# Patient Record
Sex: FEMALE | Race: WHITE
[De-identification: names, ages, dates, MRNs, and addresses within clinical notes are randomized per-mention and may not be internally consistent; named-entity substitution may affect disease eponyms.]

---

## 2021-10-08 ENCOUNTER — HOSPITAL ENCOUNTER (EMERGENCY)
Dept: HOSPITAL 83 - ED | Age: 51
Discharge: HOME | End: 2021-10-08
Payer: COMMERCIAL

## 2021-10-08 VITALS — HEIGHT: 63.98 IN | BODY MASS INDEX: 31.58 KG/M2 | WEIGHT: 185 LBS

## 2021-10-08 DIAGNOSIS — L98.9: Primary | ICD-10-CM

## 2021-10-08 DIAGNOSIS — L03.221: ICD-10-CM

## 2021-10-25 ENCOUNTER — HOSPITAL ENCOUNTER (EMERGENCY)
Age: 51
Discharge: HOME | End: 2021-10-25
Payer: COMMERCIAL

## 2021-10-25 VITALS
HEART RATE: 86 BPM | OXYGEN SATURATION: 99 % | HEIGHT: 64 IN | RESPIRATION RATE: 16 BRPM | BODY MASS INDEX: 33.8 KG/M2 | DIASTOLIC BLOOD PRESSURE: 74 MMHG | WEIGHT: 198 LBS | SYSTOLIC BLOOD PRESSURE: 163 MMHG | TEMPERATURE: 98.9 F

## 2021-10-25 DIAGNOSIS — L01.00 IMPETIGO: Primary | ICD-10-CM

## 2021-10-25 PROCEDURE — 99282 EMERGENCY DEPT VISIT SF MDM: CPT

## 2021-10-25 ASSESSMENT — PAIN SCALES - GENERAL: PAINLEVEL_OUTOF10: 6

## 2021-10-25 ASSESSMENT — PAIN DESCRIPTION - PAIN TYPE: TYPE: ACUTE PAIN

## 2021-10-26 NOTE — ED PROVIDER NOTES
membranes normal in appearance bilateral.  There are several healed lesions in the posterior scalp. Oropharynx unremarkable. Neck: Supple, full ROM, non tender to palpation in the midline, no stridor, no crepitus, no meningeal signs  Respiratory: Lungs clear to auscultation bilaterally, no wheezes, rales, or rhonchi. Not in respiratory distress  CV:  Regular rate. Regular rhythm. No murmurs  Integument: skin warm and dry. No rashes. Lymphatic: NO lymphadenopathy noted  Neurologic: GCS 15, no focal deficits, symmetric strength 5/5 in the upper and lower extremities bilaterally  Psychiatric: Normal Affect    Lab / Imaging Results   (All laboratory and radiology results have been personally reviewed by myself)  Labs:  No results found for this visit on 10/25/21. Imaging: All Radiology results interpreted by Radiologist unless otherwise noted. No orders to display       ED Course / Medical Decision Making   Medications - No data to display    Consult(s):   None    Procedure(s):   none    MDM:   Patient presents to emergency with a recent history of cellulitis of her scalp and right side of her face treated with antibiotics. Judging from patient's description and what is left over of the rash it probably was a bad case of folliculitis/impetigo/staph infection. She reports it all went away but she still has a lesion in her right ear which is crusting. I am going to discharge patient home with a prescription for Bactroban ointment to use on the lesion. No other sign of infection, patient is stable/nontoxic. Follow-up with primary care for recheck 2 to 3 days. Plan of Care/Counseling:  Kimmy Negron PA-C reviewed today's visit with the patient in addition to providing specific details for the plan of care and counseling regarding the diagnosis and prognosis. Questions are answered at this time and are agreeable with the plan. ASSESSMENT     1. Impetigo      PLAN   Discharged home.   Patient condition is good    New Medications     New Prescriptions    MUPIROCIN (BACTROBAN) 2 % OINTMENT    Apply topically 3 times daily. Electronically signed by Roberta Gale PA-C   DD: 10/25/21  **This report was transcribed using voice recognition software. Every effort was made to ensure accuracy; however, inadvertent computerized transcription errors may be present.   END OF ED PROVIDER NOTE       Roberta Gale PA-C  10/25/21 1786

## 2021-12-03 ENCOUNTER — HOSPITAL ENCOUNTER (EMERGENCY)
Age: 51
Discharge: LEFT BEFORE DISCHARGE | End: 2021-12-03
Attending: EMERGENCY MEDICINE
Payer: COMMERCIAL

## 2021-12-03 ENCOUNTER — APPOINTMENT (OUTPATIENT)
Dept: CT IMAGING | Age: 51
End: 2021-12-03
Payer: COMMERCIAL

## 2021-12-03 VITALS
OXYGEN SATURATION: 98 % | HEIGHT: 64 IN | SYSTOLIC BLOOD PRESSURE: 140 MMHG | TEMPERATURE: 97.1 F | DIASTOLIC BLOOD PRESSURE: 68 MMHG | RESPIRATION RATE: 14 BRPM | HEART RATE: 75 BPM | BODY MASS INDEX: 33.99 KG/M2

## 2021-12-03 DIAGNOSIS — I10 ESSENTIAL HYPERTENSION: ICD-10-CM

## 2021-12-03 DIAGNOSIS — G45.9 TIA (TRANSIENT ISCHEMIC ATTACK): Primary | ICD-10-CM

## 2021-12-03 PROBLEM — I63.9 ACUTE CVA (CEREBROVASCULAR ACCIDENT) (HCC): Status: ACTIVE | Noted: 2021-12-03

## 2021-12-03 LAB
ABO/RH: NORMAL
ALBUMIN SERPL-MCNC: 4.6 G/DL (ref 3.5–5.2)
ALP BLD-CCNC: 96 U/L (ref 35–104)
ALT SERPL-CCNC: 28 U/L (ref 0–32)
ANION GAP SERPL CALCULATED.3IONS-SCNC: 11 MMOL/L (ref 7–16)
ANTIBODY SCREEN: NORMAL
AST SERPL-CCNC: 20 U/L (ref 0–31)
BASOPHILS ABSOLUTE: 0.07 E9/L (ref 0–0.2)
BASOPHILS RELATIVE PERCENT: 0.9 % (ref 0–2)
BILIRUB SERPL-MCNC: 0.3 MG/DL (ref 0–1.2)
BUN BLDV-MCNC: 16 MG/DL (ref 6–20)
CALCIUM SERPL-MCNC: 9.9 MG/DL (ref 8.6–10.2)
CHLORIDE BLD-SCNC: 101 MMOL/L (ref 98–107)
CHP ED QC CHECK: NORMAL
CO2: 25 MMOL/L (ref 22–29)
CREAT SERPL-MCNC: 0.9 MG/DL (ref 0.5–1)
DAT C3: NORMAL
DAT IGG: NORMAL
DAT POLYSPECIFIC: NORMAL
DR. NOTIFY: NORMAL
EKG ATRIAL RATE: 92 BPM
EKG P AXIS: 27 DEGREES
EKG P-R INTERVAL: 118 MS
EKG Q-T INTERVAL: 374 MS
EKG QRS DURATION: 74 MS
EKG QTC CALCULATION (BAZETT): 462 MS
EKG R AXIS: 47 DEGREES
EKG T AXIS: 21 DEGREES
EKG VENTRICULAR RATE: 92 BPM
EOSINOPHILS ABSOLUTE: 0.22 E9/L (ref 0.05–0.5)
EOSINOPHILS RELATIVE PERCENT: 2.8 % (ref 0–6)
GFR AFRICAN AMERICAN: >60
GFR NON-AFRICAN AMERICAN: >60 ML/MIN/1.73
GLUCOSE BLD-MCNC: 198 MG/DL (ref 74–99)
GLUCOSE BLD-MCNC: 209 MG/DL
HCT VFR BLD CALC: 41.2 % (ref 34–48)
HEMOGLOBIN: 13.8 G/DL (ref 11.5–15.5)
IMMATURE GRANULOCYTES #: 0.04 E9/L
IMMATURE GRANULOCYTES %: 0.5 % (ref 0–5)
INR BLD: 1
LYMPHOCYTES ABSOLUTE: 2.63 E9/L (ref 1.5–4)
LYMPHOCYTES RELATIVE PERCENT: 33.3 % (ref 20–42)
MCH RBC QN AUTO: 31.4 PG (ref 26–35)
MCHC RBC AUTO-ENTMCNC: 33.5 % (ref 32–34.5)
MCV RBC AUTO: 93.8 FL (ref 80–99.9)
METER GLUCOSE: 209 MG/DL (ref 74–99)
MONOCYTES ABSOLUTE: 0.51 E9/L (ref 0.1–0.95)
MONOCYTES RELATIVE PERCENT: 6.5 % (ref 2–12)
NEUTROPHILS ABSOLUTE: 4.42 E9/L (ref 1.8–7.3)
NEUTROPHILS RELATIVE PERCENT: 56 % (ref 43–80)
PDW BLD-RTO: 12.7 FL (ref 11.5–15)
PLATELET # BLD: 182 E9/L (ref 130–450)
PMV BLD AUTO: 12.4 FL (ref 7–12)
POTASSIUM REFLEX MAGNESIUM: 4 MMOL/L (ref 3.5–5)
PROTHROMBIN TIME: 11 SEC (ref 9.3–12.4)
RBC # BLD: 4.39 E12/L (ref 3.5–5.5)
SODIUM BLD-SCNC: 137 MMOL/L (ref 132–146)
TOTAL PROTEIN: 8.2 G/DL (ref 6.4–8.3)
TROPONIN, HIGH SENSITIVITY: <6 NG/L (ref 0–9)
WBC # BLD: 7.9 E9/L (ref 4.5–11.5)

## 2021-12-03 PROCEDURE — 86901 BLOOD TYPING SEROLOGIC RH(D): CPT

## 2021-12-03 PROCEDURE — 70496 CT ANGIOGRAPHY HEAD: CPT

## 2021-12-03 PROCEDURE — 86900 BLOOD TYPING SEROLOGIC ABO: CPT

## 2021-12-03 PROCEDURE — 84484 ASSAY OF TROPONIN QUANT: CPT

## 2021-12-03 PROCEDURE — 80053 COMPREHEN METABOLIC PANEL: CPT

## 2021-12-03 PROCEDURE — 86870 RBC ANTIBODY IDENTIFICATION: CPT

## 2021-12-03 PROCEDURE — 99284 EMERGENCY DEPT VISIT MOD MDM: CPT

## 2021-12-03 PROCEDURE — 70450 CT HEAD/BRAIN W/O DYE: CPT

## 2021-12-03 PROCEDURE — 82962 GLUCOSE BLOOD TEST: CPT

## 2021-12-03 PROCEDURE — 6360000004 HC RX CONTRAST MEDICATION: Performed by: EMERGENCY MEDICINE

## 2021-12-03 PROCEDURE — 93005 ELECTROCARDIOGRAM TRACING: CPT | Performed by: EMERGENCY MEDICINE

## 2021-12-03 PROCEDURE — 85025 COMPLETE CBC W/AUTO DIFF WBC: CPT

## 2021-12-03 PROCEDURE — 93010 ELECTROCARDIOGRAM REPORT: CPT | Performed by: INTERNAL MEDICINE

## 2021-12-03 PROCEDURE — 86850 RBC ANTIBODY SCREEN: CPT

## 2021-12-03 PROCEDURE — 86880 COOMBS TEST DIRECT: CPT

## 2021-12-03 PROCEDURE — 85610 PROTHROMBIN TIME: CPT

## 2021-12-03 PROCEDURE — 0042T CT BRAIN PERFUSION: CPT

## 2021-12-03 PROCEDURE — 70498 CT ANGIOGRAPHY NECK: CPT

## 2021-12-03 PROCEDURE — G0426 INPT/ED TELECONSULT50: HCPCS | Performed by: PSYCHIATRY & NEUROLOGY

## 2021-12-03 RX ORDER — METHOCARBAMOL 750 MG/1
750 TABLET, FILM COATED ORAL 4 TIMES DAILY
COMMUNITY

## 2021-12-03 RX ORDER — HYDROCHLOROTHIAZIDE 25 MG/1
25 TABLET ORAL DAILY
Qty: 30 TABLET | Refills: 0 | Status: SHIPPED | OUTPATIENT
Start: 2021-12-03

## 2021-12-03 RX ORDER — LABETALOL HYDROCHLORIDE 5 MG/ML
10 INJECTION, SOLUTION INTRAVENOUS ONCE
Status: DISCONTINUED | OUTPATIENT
Start: 2021-12-03 | End: 2021-12-03

## 2021-12-03 RX ADMIN — IOPAMIDOL 100 ML: 755 INJECTION, SOLUTION INTRAVENOUS at 06:05

## 2021-12-03 NOTE — VIRTUAL HEALTH
Consults  Patient Location:  [address] [facility] Emergency Department    Provider Location ([facility]/State): [place], [place]    This virtual visit was conducted via interactive/real-time audio/video. [facility] Stroke and Vascular Neurology Consult for  [address] Stroke Alert through [address] @ 5:46am  12/3/2021 6:13 AM  Pt Name: King Iverson  MRN: [id number]  YOB: 1970  Date of evaluation: 12/3/2021  Primary Care Physician: No primary care provider on file. Reason for Evaluation: Stroke Evaluation with Discussion with Ed or primary team with Telemedicine and stroke evaluation with Review of imaging and labs    King Iverson is a 46 y.o. female with HTN, COPD, didn't sleep the whole night, around 4am c/o slurred speech, not able to speak, left more than right side weakness and muscle spasms, came to the ED. By the time I saw the pt on telemonitor, all her symptoms resolved, pt came to the ED with an initial BP of 212/100, at the time I saw her, her BP was 139/70, no BP med was needed      LKW: 4am  NIH:  0    Allergies  has No Known Allergies. Medications  Prior to Admission medications    Medication Sig Start Date End Date Taking? Authorizing Provider   methocarbamol (ROBAXIN) 750 MG tablet Take 750 mg by mouth 4 times daily   Yes Historical Provider, MD    Scheduled Meds:  Continuous Infusions:  PRN Meds:.  Past Medical History   has a past medical history of Hyperlipidemia and Hypertension.   Social History  Social History     Socioeconomic History    Marital status: Single     Spouse name: Not on file    Number of children: Not on file    Years of education: Not on file    Highest education level: Not on file   Occupational History    Not on file   Tobacco Use    Smoking status: Current Every Day Smoker    Smokeless tobacco: Never Used   Vaping Use    Vaping Use: Never used   Substance and Sexual Activity  Alcohol use: Never    Drug use: Never    Sexual activity: Not on file   Other Topics Concern    Not on file   Social History Narrative    Not on file     Social Determinants of Health     Financial Resource Strain:     Difficulty of Paying Living Expenses: Not on file   Food Insecurity:     Worried About Running Out of Food in the Last Year: Not on file    [place] of Food in the Last Year: Not on file   Transportation Needs:     Lack of Transportation (Medical): Not on file    Lack of Transportation (Non-Medical): Not on file   Physical Activity:     Days of Exercise per Week: Not on file    Minutes of Exercise per Session: Not on file   Stress:     Feeling of Stress : Not on file   Social Connections:     Frequency of Communication with Friends and Family: Not on file    Frequency of Social Gatherings with Friends and Family: Not on file    Attends Anabaptism Services: Not on file    Active Member of [address] [place] [address] [employer] or Organizations: Not on file    Attends Club or Organization Meetings: Not on file    Marital Status: Not on file   Intimate Partner Violence:     Fear of Current or Ex-Partner: Not on file    Emotionally Abused: Not on file    Physically Abused: Not on file    Sexually Abused: Not on file   Housing Stability:     Unable to Pay for Housing in the Last Year: Not on file    Number of [place] in the Last Year: Not on file    Unstable Housing in the Last Year: Not on file     Family History  History reviewed. No pertinent family history. OBJECTIVE  BP (!) 169/81   Pulse 99   Temp 97.1 °F (36.2 °C) (Tympanic)   Resp 18   Ht 5' 4\" (1.626 m)   SpO2 99%   BMI 33.99 kg/m²        Pre-Morbid mRS: 0  Imaging:  Images were personally reviewed with EL GARNER used to review images including:  CT brain without contrast: nothing acute  CTA imaging: no LVO    Assessment    46year old woman with uncontrolled HTN, p/w transient slurred speech and weakness, now back to normal    Recommendations:  1. NIH 0  2. Recommend Inpatient Neurology Consult for further assessment and evaluation if symptoms recurr  3.  consider . BSMHNOIVTPA  4. Not a tPA candidate symptoms resolved  5. This is most likely hypertensive encephalopathy, caused by her extreme BP   6. Keep BP below 180 at all time        Discussed with ED Physician Dr Dewey Abbott    At least 55 min of Telemedicine and time in conversation directly with ED staff and physician for the patient who is in imminent and life threatening deterioration without further treatment and evaluation. This Virtual Visit was conducted with patient's (and/or legal guardian's) consent, to provide telestroke consultation and necessary medical care.   Time spent examining patient, reviewing the images personally, reviewing the chart, perform high complexity decision making and speaking with the nursing staff regarding recommendations    Jefm Apgar, MD  Stroke, Neurocritical Care And/or [address] [place] [address] Stroke [address]  Electronically signed 12/3/2021 at 6:13 AM

## 2021-12-03 NOTE — ED PROVIDER NOTES
Department of Emergency Medicine   ED  Provider Note  Admit Date/RoomTime: 12/3/2021  5:39 AM  ED Room: 16/16        12/3/21  6:06 AM EST    Stroke Alert called:3945    HISTORY OF PRESENT ILLNESS:  (Nurses Notes Reviewed)    Chief Complaint:   Aphasia (symptoms began at 4 am 12/3/21) and Spasms      Source of history provided by:  patient and parent. History/Exam Limitations: due to condition. Audrey Rodriguez is a 46 y.o. old female presenting to the emergency department with sudden onset of aphasia, left sided weakness, dizziness, which began 400. Last known well time: 400. The episode occurred at home. Since recognized the symptoms have been improving. She has stroke risk factors of: hyperlipidemia, hypertension and smoking. There have been associated symptoms of blood pressure of 212/100. There has been no history of recent trauma. Code Status on file: No Order. NIH Stroke Scale at time of initial evaluation:   NIHSS Stroke Scale  Level of Consciousness (1a. ): Alert  LOC Questions (1b. ): Answers both correctly  LOC Commands (1c. ): Performs both tasks correctly  Visual (3. ): No visual loss  Motor Arm, Left (5a. ): Some effort against gravity  Motor Leg, Left (6a. ): Drift, but does not hit bed  Motor Leg, Right (6b. ): No drift  Sensory (8. ): Normal  Best Language (9. ): Mild to moderate aphasia  Dysarthria (10. ): Normal  Extinction and Inattention (11): No abnormality      Past Medical History:  has a past medical history of Hyperlipidemia and Hypertension. Past Surgical History:  has a past surgical history that includes Appendectomy; Cholecystectomy; and Hysterectomy. Social History:  reports that she has been smoking. She has never used smokeless tobacco. She reports that she does not drink alcohol and does not use drugs. Prior Functional Status(Modified [place] Scale):  0=No symptoms at all    Family History: family history is not on file.      The patients home medications have been reviewed. Prior to Admission medications    Medication Sig Start Date End Date Taking? Authorizing Provider   methocarbamol (ROBAXIN) 750 MG tablet Take 750 mg by mouth 4 times daily   Yes Historical Provider, MD   hydroCHLOROthiazide (HYDRODIURIL) 25 MG tablet Take 1 tablet by mouth daily 12/3/21  Yes Lissa Alfaro,        Allergies: Patient has no known allergies. Review of Systems:   Pertinent positives and negatives are stated within HPI, all other systems reviewed and are negative.    ---------------------------------------------------PHYSICAL EXAM--------------------------------------    Constitutional/General: Alert and oriented x3, well appearing, non toxic in NAD  Head: Normocephalic and atraumatic  Eyes: PERRL, EOMI  Mouth: Oropharynx clear, handling secretions, no trismus. leftsided facial droop  Neck: Supple, full ROM, non tender to palpation in the midline, no stridor, no crepitus, no meningeal signs  Pulmonary: Lungs clear to auscultation bilaterally, no wheezes, rales, or rhonchi. Not in respiratory distress  Cardiovascular:  Regular rate. Regular rhythm. No murmurs, gallops, or rubs. 2+ distal pulses  Chest: no chest wall tenderness  Abdomen: Soft. Non tender. Non distended. +BS. No rebound, guarding, or rigidity. No pulsatile masses appreciated. Musculoskeletal: Moves all extremities x 4. Warm and well perfused, no clubbing, cyanosis, or edema. Capillary refill <3 seconds  Skin: warm and dry. No rashes. Neurologic: GCS 15, CN 2-12 grossly intact, no focal deficits, symmetric strength 5/5 in the upper and lower extremities bilaterally. Speech slurred and fragmented. Normal finger to nose. Improved drift from previous reported. Please also see NIH stroke scale.   Psych: Normal Affect      -------------------------------------------------- RESULTS -------------------------------------------------  All laboratory and imaging studies have been reviewed by myself    LABS:  Results for orders placed or performed during the hospital encounter of 12/03/21   Comprehensive Metabolic Panel w/ Reflex to MG   Result Value Ref Range    Sodium 137 132 - 146 mmol/L    Potassium reflex Magnesium 4.0 3.5 - 5.0 mmol/L    Chloride 101 98 - 107 mmol/L    CO2 25 22 - 29 mmol/L    Anion Gap 11 7 - 16 mmol/L    Glucose 198 (H) 74 - 99 mg/dL    BUN 16 6 - 20 mg/dL    CREATININE 0.9 0.5 - 1.0 mg/dL    GFR Non-African American >60 >=60 mL/min/1.73    GFR African American >60     Calcium 9.9 8.6 - 10.2 mg/dL    Total Protein 8.2 6.4 - 8.3 g/dL    Albumin 4.6 3.5 - 5.2 g/dL    Total Bilirubin 0.3 0.0 - 1.2 mg/dL    Alkaline Phosphatase 96 35 - 104 U/L    ALT 28 0 - 32 U/L    AST 20 0 - 31 U/L   CBC Auto Differential   Result Value Ref Range    WBC 7.9 4.5 - 11.5 E9/L    RBC 4.39 3.50 - 5.50 E12/L    Hemoglobin 13.8 11.5 - 15.5 g/dL    Hematocrit 41.2 34.0 - 48.0 %    MCV 93.8 80.0 - 99.9 fL    MCH 31.4 26.0 - 35.0 pg    MCHC 33.5 32.0 - 34.5 %    RDW 12.7 11.5 - 15.0 fL    Platelets 899 776 - 397 E9/L    MPV 12.4 (H) 7.0 - 12.0 fL    Neutrophils % 56.0 43.0 - 80.0 %    Immature Granulocytes % 0.5 0.0 - 5.0 %    Lymphocytes % 33.3 20.0 - 42.0 %    Monocytes % 6.5 2.0 - 12.0 %    Eosinophils % 2.8 0.0 - 6.0 %    Basophils % 0.9 0.0 - 2.0 %    Neutrophils Absolute 4.42 1.80 - 7.30 E9/L    Immature Granulocytes # 0.04 E9/L    Lymphocytes Absolute 2.63 1.50 - 4.00 E9/L    Monocytes Absolute 0.51 0.10 - 0.95 E9/L    Eosinophils Absolute 0.22 0.05 - 0.50 E9/L    Basophils Absolute 0.07 0.00 - 0.20 E9/L   Protime-INR   Result Value Ref Range    Protime 11.0 9.3 - 12.4 sec    INR 1.0    Troponin   Result Value Ref Range    Troponin, High Sensitivity <6 0 - 9 ng/L   Doctor Notification   Result Value Ref Range    DR. AYALA COMPL    POCT glucose   Result Value Ref Range    Glucose 209 mg/dL    QC OK? ok    POCT Glucose   Result Value Ref Range    Meter Glucose 209 (H) 74 - 99 mg/dL   EKG 12 Lead Result Value Ref Range    Ventricular Rate 92 BPM    Atrial Rate 92 BPM    P-R Interval 118 ms    QRS Duration 74 ms    Q-T Interval 374 ms    QTc Calculation (Bazett) 462 ms    P Axis 27 degrees    R Axis 47 degrees    T Axis 21 degrees   TYPE AND SCREEN   Result Value Ref Range    ABO/Rh O POS     Antibody Screen POS    DIRECT ANTIGLOBULIN TEST   Result Value Ref Range    Polyspecific Earnestine POS    SABINE PANEL   Result Value Ref Range    SABINE IgG POS     SABINE C3 POS        RADIOLOGY:  Interpreted by Radiologist.  CT HEAD WO CONTRAST   Final Result   No acute intracranial abnormality. MRI would be useful if symptoms persist.      Inflammatory changes of the paranasal sinuses. Findings were discussed with Dr. Steven Pastor on 12/03/2021 at approximately 0630   hours [place] time. CTA HEAD W CONTRAST   Final Result   1. No perfusion mismatch. 2. Normal CTA of the head. 3. Normal CTA of the neck. 4. Mildly enlarged right level 2A cervical lymph node. Finding is   nonspecific and may be reactive in nature. 5. Paranasal sinus disease. CTA NECK W CONTRAST   Final Result   1. No perfusion mismatch. 2. Normal CTA of the head. 3. Normal CTA of the neck. 4. Mildly enlarged right level 2A cervical lymph node. Finding is   nonspecific and may be reactive in nature. 5. Paranasal sinus disease. CT BRAIN PERFUSION   Final Result   1. No perfusion mismatch. 2. Normal CTA of the head. 3. Normal CTA of the neck. 4. Mildly enlarged right level 2A cervical lymph node. Finding is   nonspecific and may be reactive in nature. 5. Paranasal sinus disease. EKG Interpretation  Interpreted by emergency department physician.      Rhythm: normal sinus   Rate: normal  Axis: normal  Conduction: normal  ST Segments: nonspecific changes  T Waves: inversion in  v1    Clinical Impression: no acute changes  Comparison to Old EKG  No previous          ------------------------- NURSING NOTES AND VITALS REVIEWED ---------------------------   The nursing notes within the ED encounter and vital signs as below have been reviewed. BP (!) 140/68   Pulse 75   Temp 97.1 °F (36.2 °C) (Tympanic)   Resp 14   Ht 5' 4\" (1.626 m)   SpO2 98%   BMI 33.99 kg/m²   Oxygen Saturation Interpretation: Normal    The patients available past medical records and past encounters were reviewed. ------------------------------ ED COURSE/MEDICAL DECISION MAKING----------------------  Medications   iopamidol (ISOVUE-370) 76 % injection 100 mL (100 mLs IntraVENous Given 12/3/21 06)       Based upon patient's stroke like symptoms a stroke neurology consult is indicated. Consult to Neurology completed. [facility]) Neurology via Telepresence      Acute CVA Core Measures:      NIH Stroke Scale Total:    t-PA Eligibility: was not recommeded by [facility] Neurologist and under the order of the ED physician  IV t-PA was considered and not given due to violations in inclusion criteria including resolving deficit  no indication for TPA. Medical Decision Makin-year-old female presenting to emergency department with sudden onset neurological symptoms including aphasia, left-sided weakness and slurring of words. Patient last known well was 4 AM.  Blood pressure on arrival was 212/100, with report of left leg weakness, drift, unintelligible slurring words, and fragmented speech. NIH on arrival was 11. Upon repeat NIH was 8 with improving blood pressure patient was evaluated by teledoc, and NIH was 1 at that time. Spoke to [facility] discussed patient and will plan to transfer patient for admission with consult to neurology. Patient continues to improve, with improvement of fragmented speech and slurring and movement. Upon waiting for transfer, nursing informed resident that patient wished to leave A.   Spoke with patient and urged the importance of full evaluation and the risks including morbidity and mortality that could occur she left AMA. Patient adamant that she does not wish to stay at this time. Patient is agreed to follow-up with neurology and PCP. Patient is ANO x3, and able to ambulate to the bathroom without difficulty. NIH 1. Stressed the importance and risks of not being evaluated and not managing causes of neurological deficit. Patient remains unwilling to stay at this time. Patient is stable when last seen. Re-Evaluations:             Re-evaluation. Patients symptoms are improving    This patient's ED course included: a personal history and physicial examination, re-evaluation prior to disposition, multiple bedside re-evaluations, IV medications, cardiac monitoring, continuous pulse oximetry and a personal history and physicial eaxmination    This patient has remained hemodynamically stable and improved during their ED course. Consultations: The case has been discussed with Dr. Jesus Mtz, they agree this patient is NOT t-PA eligible and will provide consultation. Dr. Abbea Wheatley they will admit the patient    Critical Care:         Counseling: The emergency provider has spoken with the patient and family member patient and parents and discussed todays presentation, condition, results and treatment options, in addition to providing specific details for the plan of care and counseling regarding the diagnosis and prognosis. Questions are answered at this time and they were initially agreeable but are  not agreeable with the plan. Was IV tPa Delay/Not Given: No  IV tPA therapy given but delayed(>60 minutes from ED arrival to IV tPA administration time) due to: Improvement of symptoms    IV tPA not given because:  MD recommended another approach. Blood pressure control.     --------------------------------- IMPRESSION AND DISPOSITION ---------------------------------    IMPRESSION  1. TIA (transient ischemic attack)    2.  Essential hypertension DISPOSITION  Disposition: Other Disposition: Left AMA  Patient condition is stable          ED Course as of 12/03/21 2006   Fri Dec 03, 2021   0545 Stroke code called [BP]   [id number] with Mary Anne Sigala who stated to get patient to CT scan and will need BP below 180 for TPA canidate [BP]   0606 Dr. Regina Rapp discussed patient and will admit patient for further evaluation by neurology at [facility] [MAYANK]   5832 Dr. Mary Anne Sigala stated patient not a TPA canidate [BP]   7011 Nurse reported the patient wanted to leave AMA spoke to patient, discussed risks of leaving including morbidity and mortality risks. Repeat NIH 1, blood pressure 140/68, [MAYANK]      ED Course User Index  [BP] Len Marcus DO  [MAYANK] Eligio Easley DO        This patient has chosen to leave against medical advice. I have personally explained to them that choosing to do so may result in permanent bodily harm or death. I discussed at length that without further evaluation and monitoring there may be unforeseen circumstances and deterioration resulting in permanent bodily harm or death as a result of their choice. They are alert, oriented, and competent at this time. They state that they are aware of the serious risks as explained, but they continue to wish to leave against medical advice. In light of their decision to leave against medical advice, follow-up has been arranged and they are aware of the importance of following up as instructed. They have been advised that they should return to the ED immediately if they change their mind at any time, or if their condition begins to change or worsen. Eligio Easley DO  Resident  12/03/21 2006      ATTENDING PROVIDER ATTESTATION:     I have personally performed and/or participated in the history, exam, medical decision making, and procedures and agree with all pertinent clinical information unless otherwise noted.     I have also reviewed and agree with the past medical, family and social history unless otherwise noted. I have discussed this patient in detail with the resident and provided the instruction and education regarding the evidence-based evaluation and treatment of [facility]      Patient presented to the ED for acute onset aphasia left-sided arm weakness medially the patient was brought back and we began our stroke protocol spoke with [facility] neurologist nearly immediately patient was hypertensive initial NIH when I saw the patient was 11 this then improved to 1 on multiple rechecks. Patient not a TPA candidate. No signs of any intracranial bleed. Patient was to be transferred to [facility] I was made aware after my shift was over the patient signed out [address] after she had been admitted. Patient during my examination was able to make decisions for her own had capacity make decisions as well. Please note that the withdrawal or failure to initiate urgent interventions for this patient would likely result in a life threatening deterioration or permanent disability. Systems at risk for deterioration include: Neuro    Accordingly this patient received 33 minutes of critical care time, excluding separately billable procedures.     Electronically signed by Royetta Apgar, DO on 21 at 4:49 AM EST Royetta Apgar, DO  21 0454

## 2021-12-03 NOTE — PROGRESS NOTES
Telestroke called Gypsy Wyatt RN. Symptoms:  Aphasic, facial droop  Last well:  0400  NIH: 11    Pasquale Contreras - Dr. Marti Logan spoke with Dr. Rupal Hernandez.

## 2021-12-03 NOTE — ED NOTES
Pt to er with c/o aphasia and stroke like symptoms, pt is unable to move left arm and is not able to speak in full sentences. Dr. Anna Rogers at bedside.       Ashok Martinez RN  12/03/21 7635

## 2021-12-03 NOTE — ED NOTES
Pt sleeping at this time. No distress noted. RN to see patient when she awakes     French Medina.  LANI Higgins  12/03/21 4930

## 2021-12-03 NOTE — PROGRESS NOTES
@4418 access center notified of transfer to [place] [facility] spoke with Dr. Eren Fry accepting physician

## 2021-12-03 NOTE — ED NOTES
Name: Valdo Stanley  : 1970  MRN: [id number]    Date: 12/3/2021    Benefits of immediately proceeding with Radiology exam outweigh the risks and therefore the following is being waived:      [] Pregnancy test    [] Protocol for Iodine allergy    [] MRI questionnaire    [x] BUN/Creatinine        Juan Villanueva, DO Juan Villanueva DO  21 1386

## 2021-12-03 NOTE — ED NOTES
Pt symptoms have since resolved, pt is moving bilateral extremities well, no complications, aphasia has resolved. Pt is a/oxx3. VSS. Pt had neurologist consult, and spoke with Dr. Joan Mcintyre.       Madalyn Manzanares RN  12/03/21 3857

## 2021-12-03 NOTE — ED NOTES
Patient wanting to leave AMA. Resident aware. Nurse also reminded patient that she needs to see neurology to be cleared for d/c as she is still having symptoms AEB her inability to ambulate to the restroom independently. She states that she was able to walk to the counter of the ED nurses station and she is fine. Anna Pinzon.  Tamia Lyons RN  12/03/21 7615

## 2021-12-06 LAB — ANTIBODY IDENTIFICATION: NORMAL

## 2022-05-29 ENCOUNTER — HOSPITAL ENCOUNTER (EMERGENCY)
Dept: HOSPITAL 83 - ED | Age: 52
Discharge: HOME | End: 2022-05-29
Payer: COMMERCIAL

## 2022-05-29 VITALS — WEIGHT: 194 LBS | HEIGHT: 55 IN

## 2022-05-29 DIAGNOSIS — Z90.710: ICD-10-CM

## 2022-05-29 DIAGNOSIS — Z90.89: ICD-10-CM

## 2022-05-29 DIAGNOSIS — M65.321: Primary | ICD-10-CM

## 2022-08-03 ENCOUNTER — HOSPITAL ENCOUNTER (EMERGENCY)
Dept: HOSPITAL 83 - ED | Age: 52
Discharge: HOME | End: 2022-08-03
Payer: COMMERCIAL

## 2022-08-03 VITALS — HEIGHT: 55 IN

## 2022-08-03 DIAGNOSIS — Z98.890: ICD-10-CM

## 2022-08-03 DIAGNOSIS — K29.00: Primary | ICD-10-CM

## 2022-08-03 DIAGNOSIS — R11.2: ICD-10-CM

## 2022-08-03 LAB
ALP SERPL-CCNC: 86 U/L (ref 45–117)
ALT SERPL W P-5'-P-CCNC: 52 U/L (ref 12–78)
AST SERPL-CCNC: 34 IU/L (ref 3–35)
BACTERIA #/AREA URNS HPF: (no result) /[HPF]
BASOPHILS # BLD AUTO: 0 10*3/UL (ref 0–0.1)
BASOPHILS NFR BLD AUTO: 0.6 % (ref 0–1)
BUN SERPL-MCNC: 11 MG/DL (ref 7–24)
CASTS URNS QL MICRO: (no result)
CHLORIDE SERPL-SCNC: 101 MMOL/L (ref 98–107)
CREAT SERPL-MCNC: 0.75 MG/DL (ref 0.55–1.02)
EOSINOPHIL # BLD AUTO: 0.2 10*3/UL (ref 0–0.4)
EOSINOPHIL # BLD AUTO: 3.3 % (ref 1–4)
EPI CELLS #/AREA URNS HPF: (no result) /[HPF]
ERYTHROCYTE [DISTWIDTH] IN BLOOD BY AUTOMATED COUNT: 12.3 % (ref 0–14.5)
HCT VFR BLD AUTO: 41.2 % (ref 37–47)
LIPASE SERPL-CCNC: 106 U/L (ref 73–393)
LYMPHOCYTES # BLD AUTO: 1.7 10*3/UL (ref 1.3–4.4)
LYMPHOCYTES NFR BLD AUTO: 30.6 % (ref 27–41)
MCH RBC QN AUTO: 31.2 PG (ref 27–31)
MCHC RBC AUTO-ENTMCNC: 35 G/DL (ref 33–37)
MCV RBC AUTO: 89.4 FL (ref 81–99)
MONOCYTES # BLD AUTO: 0.3 10*3/UL (ref 0.1–1)
MONOCYTES NFR BLD MANUAL: 4.6 % (ref 3–9)
NEUT #: 3.3 10*3/UL (ref 2.3–7.9)
NEUT %: 60.7 % (ref 47–73)
NRBC BLD QL AUTO: 0 % (ref 0–0)
PH UR STRIP: 6 [PH] (ref 4.5–8)
PLATELET # BLD AUTO: 151 10*3/UL (ref 130–400)
PMV BLD AUTO: 12.3 FL (ref 9.6–12.3)
POTASSIUM SERPL-SCNC: 3.3 MMOL/L (ref 3.5–5.1)
PROT SERPL-MCNC: 8.2 GM/DL (ref 6.4–8.2)
RBC # BLD AUTO: 4.61 10*6/UL (ref 4.1–5.1)
SODIUM SERPL-SCNC: 133 MMOL/L (ref 136–145)
SP GR UR: 1.01 (ref 1–1.03)
UROBILINOGEN UR STRIP-MCNC: 1 E.U./DL (ref 0–1)
WBC #/AREA URNS HPF: (no result) WBC/HPF (ref 0–5)
WBC NRBC COR # BLD AUTO: 5.4 10*3/UL (ref 4.8–10.8)